# Patient Record
Sex: FEMALE | URBAN - METROPOLITAN AREA
[De-identification: names, ages, dates, MRNs, and addresses within clinical notes are randomized per-mention and may not be internally consistent; named-entity substitution may affect disease eponyms.]

---

## 2021-05-20 ENCOUNTER — OFFICE VISIT (OUTPATIENT)
Dept: URBAN - METROPOLITAN AREA CLINIC 91 | Facility: CLINIC | Age: 36
End: 2021-05-20
Payer: COMMERCIAL

## 2021-05-20 DIAGNOSIS — H11.441 CONJUNCTIVAL CYST OF RIGHT EYE: Primary | ICD-10-CM

## 2021-05-20 DIAGNOSIS — H04.123 DRY EYE SYNDROME OF BILATERAL LACRIMAL GLANDS: ICD-10-CM

## 2021-05-20 PROCEDURE — 92002 INTRM OPH EXAM NEW PATIENT: CPT | Performed by: OPHTHALMOLOGY

## 2021-05-20 ASSESSMENT — INTRAOCULAR PRESSURE
OS: 15
OD: 16

## 2021-05-20 NOTE — IMPRESSION/PLAN
Impression: Conjunctival cyst of right eye: H11.441. Plan: Discussed Conjunctival cyst with patient. Due to the fact that the patient states it does not interfere with her day or bother here, we will continue to monitor. I explained that I do not recommend an excision at this time due to the size.